# Patient Record
Sex: FEMALE | Race: WHITE
[De-identification: names, ages, dates, MRNs, and addresses within clinical notes are randomized per-mention and may not be internally consistent; named-entity substitution may affect disease eponyms.]

---

## 2017-07-29 ENCOUNTER — HEALTH MAINTENANCE LETTER (OUTPATIENT)
Age: 32
End: 2017-07-29

## 2018-05-02 ENCOUNTER — OFFICE VISIT (OUTPATIENT)
Dept: FAMILY MEDICINE | Facility: CLINIC | Age: 33
End: 2018-05-02
Payer: COMMERCIAL

## 2018-05-02 VITALS
TEMPERATURE: 98.8 F | RESPIRATION RATE: 16 BRPM | SYSTOLIC BLOOD PRESSURE: 118 MMHG | DIASTOLIC BLOOD PRESSURE: 78 MMHG | BODY MASS INDEX: 20.19 KG/M2 | HEART RATE: 86 BPM | OXYGEN SATURATION: 97 % | WEIGHT: 114 LBS

## 2018-05-02 DIAGNOSIS — R10.2 PELVIC PAIN IN FEMALE: ICD-10-CM

## 2018-05-02 DIAGNOSIS — Z00.00 ROUTINE GENERAL MEDICAL EXAMINATION AT A HEALTH CARE FACILITY: Primary | ICD-10-CM

## 2018-05-02 NOTE — PROGRESS NOTES
"  Female Physical Note          HPI         Concerns today:  - mild pelvic discomfort, usually with running  - feels a \"pressure\" or cramp while running, has to stop and \"stretch it out\"  - not sure how long it's been bugging her (maybe 1-2 months?)  - No pain with IC  - no vag d/c  - no abnormal bleeding/menses    Due for pap    Patient Active Problem List   Diagnosis     Concern about skin cancer without diagnosis     Folliculitis       History reviewed. No pertinent past medical history.       Family History   Problem Relation Age of Onset     Hypertension Father      HEART DISEASE Paternal Grandmother               Review of Systems:     Review of Systems:  CONSTITUTIONAL: NEGATIVE for fever, chills, change in weight  INTEGUMENTARY/SKIN: NEGATIVE for worrisome rashes, moles or lesions  EYES: NEGATIVE for vision changes or irritation  ENT/MOUTH: NEGATIVE for ear, mouth and throat problems  RESP: NEGATIVE for significant cough or SOB  BREAST: NEGATIVE for masses, tenderness or discharge  CV: NEGATIVE for chest pain, palpitations or peripheral edema  GI: NEGATIVE for nausea, abdominal pain, heartburn, or change in bowel habits  : NEGATIVE for frequency, dysuria, or hematuria - see above  MUSCULOSKELETAL: NEGATIVE for significant arthralgias or myalgia  NEURO: NEGATIVE for weakness, dizziness or paresthesias  ENDOCRINE: NEGATIVE for temperature intolerance, skin/hair changes  HEME/ALLERGY: NEGATIVE for bleeding problems  PSYCHIATRIC: NEGATIVE for changes in mood or affect  Sleep:   Do you snore most or the night (as reported by a family member)? No  Do you feel sleepy or extremely tired during most of the day? No             Social History     Social History     Social History     Marital status: Single     Spouse name: N/A     Number of children: N/A     Years of education: N/A     Occupational History     Not on file.     Social History Main Topics     Smoking status: Never Smoker     Smokeless tobacco: Never " Used     Alcohol use Yes     Drug use: No     Sexual activity: Yes     Partners: Male     Other Topics Concern     Not on file     Social History Narrative       Marital Status:Single  Who lives in your household? 1 brother     Has anyone hurt you physically, for example by pushing, hitting, slapping or kicking you or forcing you to have sex? Denies  Do you feel threatened or controlled by a partner, ex-partner or anyone in your life? Denies    Sexual Health     Sexual concerns: No (not currently partnered)  STI History: Neg  Pregnancy History: No obstetric history on file.  LMP Patient's last menstrual period was 04/16/2018. Menses: q 5 days  Last Pap Smear Date: No results found for: PAP  Abnormal Pap History: None    Recommended Screening     Pap/HPV cotest every 5 years for women 30-65   Recommended and patient accepted testing.   HIV screening:  Has been tested before         Physical Exam:     Vitals: /78 (BP Location: Left arm, Patient Position: Chair, Cuff Size: Adult Small)  Pulse 86  Temp 98.8  F (37.1  C) (Oral)  Resp 16  Wt 114 lb (51.7 kg)  LMP 04/16/2018  SpO2 97%  Breastfeeding? No  BMI 20.19 kg/m2  BMI= Body mass index is 20.19 kg/(m^2).   GENERAL: healthy, alert and no distress  EYES: Eyes grossly normal to inspection, extraocular movements - intact, and PERRL  HENT: ear canals- normal; TMs- normal; Nose- normal; Mouth- no ulcers, no lesions  NECK: no tenderness, no adenopathy, no asymmetry, no masses, no stiffness; thyroid- normal to palpation  RESP: lungs clear to auscultation - no rales, no rhonchi, no wheezes  BREAST: deferred  CV: regular rates and rhythm, normal S1 S2, no S3 or S4 and no murmur, no click or rub -  ABDOMEN: soft, no tenderness, no  hepatosplenomegaly, no masses, normal bowel sounds  MS: extremities- no gross deformities noted, no edema  SKIN: no suspicious lesions, no rashes  NEURO: strength and tone- normal, sensory exam- grossly normal, mentation- intact,  speech- normal, reflexes- symmetric  BACK: no CVA tenderness, no paralumbar tenderness  - female: cervix- normal, adnexae- R normal, L enlarged ovary and tender; uterus- normal, no masses, no discharge  PSYCH: Alert and oriented times 3; speech- coherent , normal rate and volume; able to articulate logical thoughts, able to abstract reason, no tangential thoughts, no hallucinations or delusions, affect- normal  LYMPHATICS: ant. cervical- normal, post. cervical- normal, supraclavicular- normal      Assessment and Plan      Bernice was seen today for physical.    Diagnoses and all orders for this visit:    Routine general medical examination at a health care facility  -     Pap imaged thin layer screen with HPV - recommended age 30 - 65 years (select HPV order below)  -     HPV High Risk Types DNA Cervical    Pelvic pain in female  -     US Pel W/Trans*; Future      Recs:  1) US to evaluate L adnexa (possible ov cyst?)  2) No contraceptive needs at present  3) Pap updated  4) Healthy diet, exercise (regular runner)      Options for treatment and follow-up care were reviewed with the patient . Bernice Lugo and/or guardian engaged in the decision making process and verbalized understanding of the options discussed and agreed with the final plan.    Sade Morales MD

## 2018-05-02 NOTE — MR AVS SNAPSHOT
After Visit Summary   5/2/2018    Bernice Lugo    MRN: [id number]           Patient Information     Date Of Birth          1985        Visit Information        Provider Department      5/2/2018 3:40 PM Sade Morales MD [facility]        Today's Diagnoses     Routine general medical examination at a health care facility    -  1    Pelvic pain in female          Care Instructions      Preventive Health Recommendations  Female Ages 26 - 39  Yearly exam:   See your health care provider every year in order to    Review health changes.     Discuss preventive care.      Review your medicines if you your doctor has prescribed any.    Until age 30: Get a Pap test every three years (more often if you have had an abnormal result).    After age 30: Talk to your doctor about whether you should have a Pap test every 3 years or have a Pap test with HPV screening every 5 years.   You do not need a Pap test if your uterus was removed (hysterectomy) and you have not had cancer.  You should be tested each year for STDs (sexually transmitted diseases), if you're at risk.   Talk to your provider about how often to have your cholesterol checked.  If you are at risk for diabetes, you should have a diabetes test (fasting glucose).  Shots: Get a flu shot each year. Get a tetanus shot every 10 years.   Nutrition:     Eat at least 5 servings of fruits and vegetables each day.    Eat whole-grain bread, whole-wheat pasta and brown rice instead of white grains and rice.    Talk to your provider about Calcium and Vitamin D.     Lifestyle    Exercise at least 150 minutes a week (30 minutes a day, 5 days of the week). This will help you control your weight and prevent disease.    Limit alcohol to one drink per day.    No smoking.     Wear sunscreen to prevent skin cancer.    See your dentist every six months for an exam and cleaning.            Follow-ups after your visit        Your next 10  appointments already scheduled     May 09, 2018  3:45 PM CDT   US PELVIC COMPLETE W TRANSVAGINAL with [id number]   [facility] US ([facility])    [address]  [address] [facility] [address] [phone]           Please bring a list of your medicines (including vitamins, minerals and over-the-counter drugs). Also, tell your doctor about any allergies you may have. Wear comfortable clothes and leave your valuables at home.  Adults: Drink six 8-ounce glasses of fluid one hour before your exam. Do NOT empty your bladder.  If you need to empty your bladder before your exam, try to release only a little bit of urine. Then, drink another 8oz glass of fluid.  Children: Children who are potty trained should drink at least 4 cups (32 oz) of liquid 45 minutes to one hour prior to the exam. The child s bladder must be full in order to achieve a diagnostic exam. If your child is very uncomfortable or has an urgent need to pee, please notify a technologist; they will try to find out how much longer the wait may be and provide instructions to help relieve the pressure. Occasionally it is medically necessary to insert a urinary catheter to fill the bladder.  Please call the Imaging Department at your exam site with any questions.              Future tests that were ordered for you today     Open Future Orders        Priority Expected Expires Ordered    US Pel W/Trans* Routine  5/2/2019 5/2/2018            Who to contact     Please call your clinic at [phone] to:    Ask questions about your health    Make or cancel appointments    Discuss your medicines    Learn about your test results    Speak to your doctor            Additional Information About Your Visit        ParQnow Information     [employer]now is an electronic gateway that provides easy, online access to your medical records. With ParQnow, you can request a clinic appointment, read your test results, renew a prescription or  communicate with your care team.     To sign up for [employer] visit the website at [email or web address]   You will be asked to enter the access code listed below, as well as some personal information. Please follow the directions to create your username and password.     Your access code is: [email or web address]  Expires: 2018  4:46 PM     Your access code will  in 90 days. If you need help or a new code, please contact your [facility] or call [phone] for assistance.        Care EveryWhere ID     This is your Care EveryWhere ID. This could be used by other organizations to access your [facility] medical records  [id number]        Your Vitals Were     Pulse Temperature Respirations Last Period Pulse Oximetry Breastfeeding?    86 98.8  F (37.1  C) (Oral) 16 2018 97% No    BMI (Body Mass Index)                   20.19 kg/m2            Blood Pressure from Last 3 Encounters:   18 118/78   16 126/77   10/10/13 133/79    Weight from Last 3 Encounters:   18 114 lb (51.7 kg)   16 120 lb (54.4 kg)   13 123 lb (55.8 kg)              We Performed the Following     HPV High Risk Types DNA Cervical     Pap imaged thin layer screen with HPV - recommended age 30 - 65 years (select HPV order below)        Primary Care Provider Office Phone # Fax #    Jeana Solano PA-C [phone]       [address] [place] [address]        Equal Access to Services     REZA BOONE : Hadlopez colono Sojones, waaxda luqadaha, qaybta kaalmada adeegyaana maría, alix espino. So [facility] [phone].    ATENCIÓN: Si habla español, tiene a aj disposición servicios gratuitos de asistencia lingüística. Llame al [phone].    We comply with applicable federal civil rights laws and [place] laws. We do not discriminate on the basis of race, color, national origin, age, disability, sex, sexual orientation, or gender  identity.            Thank you!     Thank you for choosing [facility]  for your care. Our goal is always to provide you with excellent care. Hearing back from our patients is one way we can continue to improve our services. Please take a few minutes to complete the written survey that you may receive in the mail after your visit with us. Thank you!             Your Updated Medication List - Protect others around you: Learn how to safely use, store and throw away your medicines at www.disposemymeds.org.          This list is accurate as of 5/2/18  4:46 PM.  Always use your most recent med list.                   Brand Name Dispense Instructions for use Diagnosis    HYDROcodone-acetaminophen 5-325 MG per tablet    NORCO    15 tablet    Take 1-2 tablets by mouth every 4 hours as needed for moderate to severe pain        ibuprofen 600 MG tablet    ADVIL/MOTRIN    20 tablet    Take 1 tablet (600 mg) by mouth every 8 hours as needed for moderate pain

## 2018-05-08 LAB
COPATH REPORT: NORMAL
PAP: NORMAL

## 2018-05-09 ENCOUNTER — RADIANT APPOINTMENT (OUTPATIENT)
Dept: ULTRASOUND IMAGING | Facility: CLINIC | Age: 33
End: 2018-05-09
Attending: FAMILY MEDICINE
Payer: COMMERCIAL

## 2018-05-09 DIAGNOSIS — R10.2 PELVIC PAIN IN FEMALE: ICD-10-CM

## 2018-05-09 LAB
FINAL DIAGNOSIS: NORMAL
HPV HR 12 DNA CVX QL NAA+PROBE: NEGATIVE
HPV16 DNA SPEC QL NAA+PROBE: NEGATIVE
HPV18 DNA SPEC QL NAA+PROBE: NEGATIVE
SPECIMEN DESCRIPTION: NORMAL
SPECIMEN SOURCE CVX/VAG CYTO: NORMAL

## 2018-08-15 ENCOUNTER — OFFICE VISIT (OUTPATIENT)
Dept: FAMILY MEDICINE | Facility: CLINIC | Age: 33
End: 2018-08-15
Payer: COMMERCIAL

## 2018-08-15 VITALS
HEART RATE: 75 BPM | SYSTOLIC BLOOD PRESSURE: 126 MMHG | TEMPERATURE: 98.7 F | DIASTOLIC BLOOD PRESSURE: 77 MMHG | WEIGHT: 113.6 LBS | BODY MASS INDEX: 20.12 KG/M2 | OXYGEN SATURATION: 100 %

## 2018-08-15 DIAGNOSIS — R23.9 SKIN CHANGE: Primary | ICD-10-CM

## 2018-08-15 RX ORDER — BENZOCAINE/MENTHOL 6 MG-10 MG
LOZENGE MUCOUS MEMBRANE
Qty: 30 G | Refills: 0 | Status: SHIPPED | OUTPATIENT
Start: 2018-08-15

## 2018-08-15 NOTE — PROGRESS NOTES
Preceptor Attestation:   Patient seen, evaluated and discussed with the resident.   I have verified the content of the note, which accurately reflects my assessment of the patient and the plan of care.   Supervising Physician:  Gatito Espino MD

## 2018-08-15 NOTE — PATIENT INSTRUCTIONS
Here is the plan from today's visit    1. Skin change  Try what you've been doing. Symptom treatment. Follow up if worse.  - hydrocortisone (CORTAID) 1 % cream; Apply sparingly to affected area three times daily for 7- 14 days.  Dispense: 30 g; Refill: 0    Please call or return to clinic if your symptoms don't go away.    Follow up plan  Please make a clinic appointment for follow up with your primary physician Sade Morales MD if not improved.     Thank you for coming to [facility] today.  Lab Testing:  **If you had lab testing today and your results are reassuring or normal they will be mailed to you or sent through [facility] within 7 days.   **If the lab tests need quick action we will call you with the results.  The phone number we will call with results is # [phone] (home) . If this is not the best number please call our clinic and change the number.  Medication Refills:  If you need any refills please call your pharmacy and they will contact us.   If you need to  your refill at a new pharmacy, please contact the new pharmacy directly. The new pharmacy will help you get your medications transferred faster.   Scheduling:  If you have any concerns about today's visit or wish to schedule another appointment please call our office during normal business hours [phone] (8-5:00 M-F)  If a referral was made to a [facility] and you don't get a call from central scheduling please call [phone].  If a Mammogram was ordered for you at [facility] call [phone] to schedule or change your appointment.  If you had an XRay/CT/Ultrasound/MRI ordered the number is [phone] to schedule or change your radiology appointment.   Medical Concerns:  If you have urgent medical concerns please call [phone] at any time of the day.    Fabio Awan,

## 2018-08-15 NOTE — PROGRESS NOTES
HPI       Bernice Lugo is a 33 year old  who presents for   Chief Complaint   Patient presents with     Insect Bites     not feeling well x 5 days , bumps on face and left ear       Rash/Lesion  Onset: 2 days, sick about a week ago.    Description:   Location: Left side of head.   Color: non erythema  Character: round  Itching (Pruritis): Yes yesterday  Pain?:no but swollen    Progression of Symptoms:  same    Accompanying Signs & Symptoms:  Fever: no  Body aches or joint pain:  No, tired recently   Sore throat symptoms:Yes Monday. No Sore throat now.   Recent cold symptoms: Yes    History:   Previous similar rash: no    Precipitating factors:   Exposure to similar rash: no.   New exposures: yes cabin a week ago, [facility]. don't remember bug bites.   Recent travel: Yes cabin.   New Medication: no    What makes it better?:  Tried some hydrocortisone (helped itching) and benadryl   Therapies Tried and outcome:  See above.        +++++++    Problem, Medication and Allergy Lists were reviewed and updated if needed..    Patient is an established patient of this clinic..         Review of Systems:   Review of Systems   Constitutional: Positive for fatigue. Negative for chills, fever and unexpected weight change.   HENT: Positive for sore throat.    Eyes: Negative for pain and visual disturbance.   Respiratory: Negative for shortness of breath.    Cardiovascular: Negative for chest pain.   Gastrointestinal: Negative for nausea and vomiting.   Genitourinary: Negative for flank pain.   Musculoskeletal: Negative for myalgias.   Skin: Negative for color change and rash.        lump on left forehead   Neurological: Negative for dizziness, facial asymmetry, weakness and light-headedness.            Physical Exam:     Vitals:    08/15/18 1726   BP: 126/77   Pulse: 75   Temp: 98.7  F (37.1  C)   TempSrc: Oral   SpO2: 100%   Weight: 113 lb 9.6 oz (51.5 kg)     Body mass index is 20.12 kg/(m^2).  Vitals were reviewed  and were normal     Physical Exam   Constitutional: She is oriented to person, place, and time. She appears well-developed and well-nourished.   HENT:   Head: Normocephalic.   Eyes: Conjunctivae are normal. Pupils are equal, round, and reactive to light. No scleral icterus.   Neck: Neck supple. No thyromegaly present.   Cardiovascular: Normal rate and regular rhythm.    No murmur heard.  Pulmonary/Chest: Effort normal. No respiratory distress.   Musculoskeletal: Normal range of motion. She exhibits no tenderness.   Lymphadenopathy:     She has no cervical adenopathy.   Neurological: She is alert and oriented to person, place, and time.   Skin:        Psychiatric: She has a normal mood and affect. Her behavior is normal.         Results:   none    Assessment and Plan        1. Skin change  Skin changes/lesion left forehead at hairline.  Patient does have Been in [facility].  She does not believe she was exposed to take, and does not remember any other insect bite..  Of exposure without close inspection by patient was less than 6 hours.  Potential interaction happened greater than 72 hours ago.  Because of this history and unlikely bite, and absence of erythema migrans, did not elect to give prophylactic dose of antibiotic.  Patient had attempted hydrocortisone cream which did help her spot.  Patient recommended to continue this as well as try ice and heat.  Patient recommended to follow-up if not improving in 7-10 days.  - hydrocortisone (CORTAID) 1 % cream; Apply sparingly to affected area three times daily for 7- 14 days.  Dispense: 30 g; Refill: 0       There are no discontinued medications.    Options for treatment and follow-up care were reviewed with the patient. Bernice Lugo  engaged in the decision making process and verbalized understanding of the options discussed and agreed with the final plan.    Fabio Awan DO, MA  Family Medicine PGY-2  [facility]  Family Medicine   Pager: [phone]

## 2018-08-15 NOTE — MR AVS SNAPSHOT
After Visit Summary   8/15/2018    Bernice Lugo    MRN: [id number]           Patient Information     Date Of Birth          1985        Visit Information        Provider Department      8/15/2018 1:00 PM Fabio Awan DO [facility]        Today's Diagnoses     Skin change    -  1      Care Instructions    Here is the plan from today's visit    1. Skin change  Try what you've been doing. Symptom treatment. Follow up if worse.  - hydrocortisone (CORTAID) 1 % cream; Apply sparingly to affected area three times daily for 7- 14 days.  Dispense: 30 g; Refill: 0    Please call or return to clinic if your symptoms don't go away.    Follow up plan  Please make a clinic appointment for follow up with your primary physician Sade Morales MD if not improved.     Thank you for coming to [facility] today.  Lab Testing:  **If you had lab testing today and your results are reassuring or normal they will be mailed to you or sent through [employer] within 7 days.   **If the lab tests need quick action we will call you with the results.  The phone number we will call with results is # [phone] (home) . If this is not the best number please call our clinic and change the number.  Medication Refills:  If you need any refills please call your pharmacy and they will contact us.   If you need to  your refill at a new pharmacy, please contact the new pharmacy directly. The new pharmacy will help you get your medications transferred faster.   Scheduling:  If you have any concerns about today's visit or wish to schedule another appointment please call our office during normal business hours [phone] (8-5:00 M-F)  If a referral was made to a [facility] and you don't get a call from central scheduling please call [phone].  If a Mammogram was ordered for you at [facility] call [phone] to schedule or change your appointment.  If you had an  XRay/CT/Ultrasound/MRI ordered the number is [phone] to schedule or change your radiology appointment.   Medical Concerns:  If you have urgent medical concerns please call [phone] at any time of the day.    Fabio Awan, DO            Follow-ups after your visit        Follow-up notes from your care team     Return if symptoms worsen or fail to improve.      Who to contact     Please call your clinic at [phone] to:    Ask questions about your health    Make or cancel appointments    Discuss your medicines    Learn about your test results    Speak to your doctor            Additional Information About Your Visit        [employer] Information     [employer] gives you secure access to your electronic health record. If you see a primary care provider, you can also send messages to your care team and make appointments. If you have questions, please call your primary care clinic.  If you do not have a primary care provider, please call [phone] and they will assist you.      [employer] is an electronic gateway that provides easy, online access to your medical records. With [employer], you can request a clinic appointment, read your test results, renew a prescription or communicate with your care team.     To access your existing account, please contact your [facility] or call [phone] for assistance.        Care EveryWhere ID     This is your Care EveryWhere ID. This could be used by other organizations to access your [facility] medical records  [id number]         Blood Pressure from Last 3 Encounters:   05/02/18 118/78   11/29/16 126/77   10/10/13 133/79    Weight from Last 3 Encounters:   05/02/18 114 lb (51.7 kg)   11/29/16 120 lb (54.4 kg)   09/09/13 123 lb (55.8 kg)              Today, you had the following     No orders found for display         Today's Medication Changes          These changes are accurate as of 8/15/18  1:33 PM.  If you have any questions, ask your nurse  or doctor.               Start taking these medicines.        Dose/Directions    hydrocortisone 1 % cream   Commonly known as:  CORTAID   Used for:  Skin change        Apply sparingly to affected area three times daily for 7- 14 days.   Quantity:  30 g   Refills:  0            Where to get your medicines      These medications were sent to [facility] [address] [place] [address] AT [address] [address]     Phone:  [phone]     hydrocortisone 1 % cream                Primary Care Provider Office Phone # Fax #    Sade Morales [phone]       [address] [facility] [address]  [facility] [address]        Equal Access to Services     REZA BOONE : Ynes Blanco, wacolton reich, qayodit kaalmada delonte, alix espino. So [facility] [phone].    ATENCIÓN: Si habla español, tiene a aj disposición servicios gratuitos de asistencia lingüística. Llame al [phone].    We comply with applicable federal civil rights laws and [place] laws. We do not discriminate on the basis of race, color, national origin, age, disability, sex, sexual orientation, or gender identity.            Thank you!     Thank you for choosing [facility]  for your care. Our goal is always to provide you with excellent care. Hearing back from our patients is one way we can continue to improve our services. Please take a few minutes to complete the written survey that you may receive in the mail after your visit with us. Thank you!             Your Updated Medication List - Protect others around you: Learn how to safely use, store and throw away your medicines at www.disposemymeds.org.          This list is accurate as of 8/15/18  1:33 PM.  Always use your most recent med list.                   Brand Name Dispense Instructions for use Diagnosis    hydrocortisone 1 % cream    CORTAID    30 g     Apply sparingly to affected area three times daily for 7- 14 days.    Skin change

## 2018-08-16 ASSESSMENT — ENCOUNTER SYMPTOMS
COLOR CHANGE: 0
LIGHT-HEADEDNESS: 0
FEVER: 0
FACIAL ASYMMETRY: 0
MYALGIAS: 0
FATIGUE: 1
UNEXPECTED WEIGHT CHANGE: 0
CHILLS: 0
WEAKNESS: 0
SHORTNESS OF BREATH: 0
EYE PAIN: 0
FLANK PAIN: 0
NAUSEA: 0
SORE THROAT: 1
DIZZINESS: 0
VOMITING: 0

## 2018-08-24 ENCOUNTER — TRANSFERRED RECORDS (OUTPATIENT)
Dept: HEALTH INFORMATION MANAGEMENT | Facility: CLINIC | Age: 33
End: 2018-08-24

## 2018-09-17 ENCOUNTER — DOCUMENTATION ONLY (OUTPATIENT)
Dept: FAMILY MEDICINE | Facility: CLINIC | Age: 33
End: 2018-09-17

## 2018-09-17 NOTE — PROGRESS NOTES
"When opening a documentation only encounter, be sure to enter in \"Chief Complaint\" Forms and in \" Comments\" Title of form, description if needed.    Bernice is a 33 year old  female  Form received via: Fax  Form now resides in: Provider Tomy [place], ILYA        Form has been completed by provider.                     "

## 2018-12-28 ENCOUNTER — TELEPHONE (OUTPATIENT)
Dept: FAMILY MEDICINE | Facility: CLINIC | Age: 33
End: 2018-12-28

## 2018-12-28 NOTE — TELEPHONE ENCOUNTER
[facility] phone call message-patient reporting a symptom:     Symptom: Bleeding after her period and urinary urgency with pressure.  When did symptoms begin? For the last one and a half weeks    Characteristics: (location on body, intensity, what makes it better or worse, associated symptoms )  Urinary urgency with pressure and vaginal bleeding ongoing      Same Day Visit Offered: wanted to discuss with a nurse first.    Additional comments: please call the patient.    OK to leave message on voice mail? Yes    Advised patient that RN would call back within 3 hours, unless emergent   Primary language: English      needed? No    Call taken on December 28, 2018 at 12:53 PM by Rita Ervin

## 2018-12-28 NOTE — TELEPHONE ENCOUNTER
RN returned call to pt. Pt states period ended last week but still having some spotting and also having pressure. Pt has noticed increased frequency as well    Pt denies burning with urination or blood in urine. States spotting appears to be vaginal.       Pt denies abdominal pain or fever.    Pt is out of town. RN recommended increasing water and cranberry juice and to find a clinic or urgent care where ever she is to be seen. Pt verbalized understanding. Pt also sent mychart to PCP    Poonam Baum RN

## 2018-12-29 ENCOUNTER — HOSPITAL ENCOUNTER (EMERGENCY)
Facility: OTHER | Age: 33
Discharge: HOME | End: 2018-12-29
Attending: EMERGENCY MEDICINE
Payer: MEDICAID

## 2018-12-29 VITALS
SYSTOLIC BLOOD PRESSURE: 165 MMHG | OXYGEN SATURATION: 100 % | TEMPERATURE: 98 F | DIASTOLIC BLOOD PRESSURE: 83 MMHG | HEART RATE: 71 BPM | RESPIRATION RATE: 16 BRPM | WEIGHT: 115 LBS | HEIGHT: 63 IN | BODY MASS INDEX: 20.38 KG/M2

## 2018-12-29 DIAGNOSIS — N93.8 DYSFUNCTIONAL UTERINE BLEEDING: ICD-10-CM

## 2018-12-29 DIAGNOSIS — R35.0 URINARY FREQUENCY: Primary | ICD-10-CM

## 2018-12-29 LAB
ANION GAP SERPL CALC-SCNC: 9 MMOL/L
B-HCG UR QL: NEGATIVE
BASOPHILS # BLD AUTO: 0.02 K/UL
BASOPHILS NFR BLD: 0.3 %
BILIRUB UR QL STRIP: NEGATIVE
BUN SERPL-MCNC: 10 MG/DL
CALCIUM SERPL-MCNC: 9.6 MG/DL
CHLORIDE SERPL-SCNC: 106 MMOL/L
CLARITY UR: CLEAR
CO2 SERPL-SCNC: 26 MMOL/L
COLOR UR: ABNORMAL
CREAT SERPL-MCNC: 0.8 MG/DL
CTP QC/QA: YES
DIFFERENTIAL METHOD: NORMAL
EOSINOPHIL # BLD AUTO: 0 K/UL
EOSINOPHIL NFR BLD: 0.6 %
ERYTHROCYTE [DISTWIDTH] IN BLOOD BY AUTOMATED COUNT: 12.2 %
EST. GFR  (AFRICAN AMERICAN): >60 ML/MIN/1.73 M^2
EST. GFR  (NON AFRICAN AMERICAN): >60 ML/MIN/1.73 M^2
GLUCOSE SERPL-MCNC: 113 MG/DL
GLUCOSE UR QL STRIP: NEGATIVE
HCT VFR BLD AUTO: 39.1 %
HGB BLD-MCNC: 13 G/DL
HGB UR QL STRIP: ABNORMAL
KETONES UR QL STRIP: NEGATIVE
LEUKOCYTE ESTERASE UR QL STRIP: ABNORMAL
LYMPHOCYTES # BLD AUTO: 2.6 K/UL
LYMPHOCYTES NFR BLD: 36.4 %
MCH RBC QN AUTO: 29.9 PG
MCHC RBC AUTO-ENTMCNC: 33.2 G/DL
MCV RBC AUTO: 90 FL
MICROSCOPIC COMMENT: ABNORMAL
MONOCYTES # BLD AUTO: 0.4 K/UL
MONOCYTES NFR BLD: 5.4 %
NEUTROPHILS # BLD AUTO: 4.1 K/UL
NEUTROPHILS NFR BLD: 57 %
NITRITE UR QL STRIP: NEGATIVE
PH UR STRIP: 8 [PH] (ref 5–8)
PLATELET # BLD AUTO: 314 K/UL
PMV BLD AUTO: 10.1 FL
POTASSIUM SERPL-SCNC: 4 MMOL/L
PROT UR QL STRIP: ABNORMAL
RBC # BLD AUTO: 4.35 M/UL
RBC #/AREA URNS HPF: 22 /HPF (ref 0–4)
SODIUM SERPL-SCNC: 141 MMOL/L
SP GR UR STRIP: 1.01 (ref 1–1.03)
URN SPEC COLLECT METH UR: ABNORMAL
UROBILINOGEN UR STRIP-ACNC: NEGATIVE EU/DL
WBC # BLD AUTO: 7.09 K/UL
WBC #/AREA URNS HPF: 3 /HPF (ref 0–5)

## 2018-12-29 PROCEDURE — 81025 URINE PREGNANCY TEST: CPT | Performed by: EMERGENCY MEDICINE

## 2018-12-29 PROCEDURE — 81000 URINALYSIS NONAUTO W/SCOPE: CPT

## 2018-12-29 PROCEDURE — 85025 COMPLETE CBC W/AUTO DIFF WBC: CPT

## 2018-12-29 PROCEDURE — 87086 URINE CULTURE/COLONY COUNT: CPT

## 2018-12-29 PROCEDURE — 80048 BASIC METABOLIC PNL TOTAL CA: CPT

## 2018-12-29 PROCEDURE — 99283 EMERGENCY DEPT VISIT LOW MDM: CPT

## 2018-12-29 RX ORDER — NITROFURANTOIN 25; 75 MG/1; MG/1
100 CAPSULE ORAL 2 TIMES DAILY
Qty: 10 CAPSULE | Refills: 0 | Status: SHIPPED | OUTPATIENT
Start: 2018-12-29 | End: 2019-01-03

## 2018-12-29 NOTE — ED NOTES
Pt presents with c/o pressure to her bladder for a few weeks with frequency and having an irregular period. Pt reports having her menstrual period around 12/16, then had spotting this week and is now bleeding with lower abdominal cramping. Pt denies any fevers, chills or dizziness. Pt is AAOx4. RR are even and unlabored. SKin is warm and dry. Pt is ambulatory with a steady gait.

## 2018-12-29 NOTE — ED PROVIDER NOTES
Encounter Date: 12/29/2018       History     Chief Complaint   Patient presents with    Urinary Frequency     with bladder discomfort x 1 month. Denies dysuria. Also reports irregular bleeding with period.      Patient is a 33-year-old female presenting to the emergency department with complaints of urinary frequency and dysfunctional uterine bleeding.  The patient reports that for the past month or so, she has had what feels like mild bladder discomfort however states it is significantly worsened over the past 2 days.  She reports urinary frequency and urgency.  She denies any dysuria but reports lower abdominal pressure.  No abdominal pain, nausea or vomiting. No fevers or chills. She also reports over the past few days she has had irregular vaginal bleeding.  She states that last week she had some spotting and now she feels like she is having another cycle and has only been 2 weeks since her last cycle.  She admits she is visiting from [place].  No history of irregular vaginal bleeding previously.This is the extent of the patient's complaints at this time.         The history is provided by the patient.     Review of patient's allergies indicates:  No Known Allergies  History reviewed. No pertinent past medical history.  History reviewed. No pertinent surgical history.  No family history on file.  Social History     Tobacco Use    Smoking status: Never Smoker   Substance Use Topics    Alcohol use: Yes     Comment: socially    Drug use: No     Review of Systems   Constitutional: Negative for activity change, appetite change, chills, fatigue and fever.   HENT: Negative for congestion, rhinorrhea and sore throat.    Eyes: Negative for photophobia and visual disturbance.   Respiratory: Negative for cough, shortness of breath and wheezing.    Cardiovascular: Negative for chest pain.   Gastrointestinal: Positive for abdominal pain. Negative for diarrhea, nausea and vomiting.   Genitourinary: Positive for  frequency and urgency. Negative for dysuria and hematuria.   Musculoskeletal: Negative for back pain, myalgias and neck pain.   Skin: Negative for color change and wound.   Neurological: Negative for weakness and headaches.   Psychiatric/Behavioral: Negative for agitation and confusion.       Physical Exam     Initial Vitals [12/29/18 1330]   BP Pulse Resp Temp SpO2   (!) 165/83 71 16 98.2 °F (36.8 °C) 100 %      MAP       --         Physical Exam    Nursing note and vitals reviewed.  Constitutional: She appears well-developed and well-nourished. She is not diaphoretic. She is cooperative.  Non-toxic appearance. She does not have a sickly appearance. She does not appear ill. No distress.   Well-appearing,  female unaccompanied in the emergency department.  Speaking clearly in full sentences.  No acute distress.   HENT:   Head: Normocephalic and atraumatic.   Right Ear: External ear normal.   Left Ear: External ear normal.   Nose: Nose normal.   Mouth/Throat: Oropharynx is clear and moist.   Eyes: Conjunctivae and EOM are normal.   Neck: Normal range of motion. Neck supple.   Cardiovascular: Normal rate, regular rhythm and normal heart sounds.   Pulmonary/Chest: Breath sounds normal. No respiratory distress. She has no wheezes.   Abdominal: Soft. Bowel sounds are normal. She exhibits no distension. There is no tenderness. There is no rebound, no guarding and no CVA tenderness.   Musculoskeletal: Normal range of motion.   Neurological: She is alert and oriented to person, place, and time.   Skin: Skin is warm.   Psychiatric: She has a normal mood and affect. Her behavior is normal. Judgment and thought content normal.         ED Course   Procedures  Labs Reviewed   URINALYSIS, REFLEX TO URINE CULTURE - Abnormal; Notable for the following components:       Result Value    Color, UA Red (*)     Protein, UA Trace (*)     Occult Blood UA 3+ (*)     Leukocytes, UA Trace (*)     All other components within normal  limits    Narrative:     Preferred Collection Type->Urine, Clean Catch   URINALYSIS MICROSCOPIC - Abnormal; Notable for the following components:    RBC, UA 22 (*)     All other components within normal limits    Narrative:     Preferred Collection Type->Urine, Clean Catch   BASIC METABOLIC PANEL - Abnormal; Notable for the following components:    Glucose 113 (*)     All other components within normal limits   CULTURE, URINE   CBC W/ AUTO DIFFERENTIAL   POCT URINE PREGNANCY           Medical Decision Making:   Initial Assessment:   Urgent evaluation of a 33-year-old female presenting to the emergency department with complaints of urinary frequency, urgency, and dysfunctional uterine bleeding.  Patient is afebrile, nontoxic appearing, hemodynamically stable. Physical exam reveals regular rate rhythm, lungs are clear to auscultation bilaterally.  Abdomen is soft and nontender.  Will plan for UA, UPT, also check a CBC to ensure patient is not anemic.  Clinical Tests:   Lab Tests: Ordered and Reviewed  The following lab test(s) were unremarkable: CBC, BMP, UPT and Urinalysis  ED Management:  CBC shows no leukocytosis, H&H is 13 and 39.1.  BMP is unremarkable.  UPT is negative. UA shows trace leukocytes with hematuria also noted as the patient is currently bleeding.  Given patient's signs symptoms, will go ahead and treat for UTI with Macrobid and send urine culture.  At this point, do not feel that we need to further evaluate her dysfunctional uterine bleeding.  Hemodynamically stable, no pregnancy at this time.  Advised to follow up with OBGYN when she returns home to [place].  Patient feels comfortable this plan.  Discharged home in stable condition.The patient was instructed to follow up with a primary care provider in 2 days or to return to the emergency department for worsening symptoms. The treatment plan was discussed with the patient who demonstrated understanding and comfort with plan.    This note was  created using [employer] Fluency Direct. There may be typographical errors secondary to dictation.                         Clinical Impression:     1. Urinary frequency    2. Dysfunctional uterine bleeding           Disposition:   Disposition: Discharged  Condition: Stable                        Trista Cevallos PA-C  12/29/18 4851

## 2018-12-29 NOTE — ED NOTES
Pt presents with c/o pressure on her bladder with frequency for a few weeks and an irregular period. Pt reports having her normal period over a week ago and then spotting this week with bleeding currently. Pt c/o lower abdominal cramping, denies any fevers or chills. Pt is AAOx4. RR are even and unlabored. Skin is warm and dry. Pt is ambulatory with a steady gait.

## 2018-12-31 LAB — BACTERIA UR CULT: NO GROWTH

## 2019-11-04 ENCOUNTER — HEALTH MAINTENANCE LETTER (OUTPATIENT)
Age: 34
End: 2019-11-04

## 2020-11-22 ENCOUNTER — HEALTH MAINTENANCE LETTER (OUTPATIENT)
Age: 35
End: 2020-11-22

## 2021-09-19 ENCOUNTER — HEALTH MAINTENANCE LETTER (OUTPATIENT)
Age: 36
End: 2021-09-19

## 2022-01-08 ENCOUNTER — HEALTH MAINTENANCE LETTER (OUTPATIENT)
Age: 37
End: 2022-01-08

## 2022-11-20 ENCOUNTER — HEALTH MAINTENANCE LETTER (OUTPATIENT)
Age: 37
End: 2022-11-20

## 2023-04-15 ENCOUNTER — HEALTH MAINTENANCE LETTER (OUTPATIENT)
Age: 38
End: 2023-04-15